# Patient Record
Sex: MALE | Race: WHITE
[De-identification: names, ages, dates, MRNs, and addresses within clinical notes are randomized per-mention and may not be internally consistent; named-entity substitution may affect disease eponyms.]

---

## 2018-04-28 ENCOUNTER — HOSPITAL ENCOUNTER (EMERGENCY)
Dept: HOSPITAL 97 - ER | Age: 73
Discharge: HOME | End: 2018-04-28
Payer: COMMERCIAL

## 2018-04-28 VITALS — TEMPERATURE: 98.5 F

## 2018-04-28 VITALS — OXYGEN SATURATION: 96 % | DIASTOLIC BLOOD PRESSURE: 63 MMHG | SYSTOLIC BLOOD PRESSURE: 133 MMHG
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
## 2018-04-28 DIAGNOSIS — M25.551: Primary | ICD-10-CM
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
## 2018-04-28 PROCEDURE — 72192 CT PELVIS W/O DYE: CPT

## 2018-04-28 PROCEDURE — 99284 EMERGENCY DEPT VISIT MOD MDM: CPT

## 2018-04-28 NOTE — ER
Nurse's Notes                                                                                     

 [facility]                                                                

Name: Jamie Chou                                                                                 

Age: 72 yrs                                                                                       

Sex: Male                                                                                         

: 1945                                                                                   

MRN: [id number]                                                                                   

Arrival Date: 2018                                                                          

Time: 14:57                                                                                       

Account#: [id number]                                                                            

Bed 24                                                                                            

Private MD: None, None                                                                            

Diagnosis: Fall on same level, unspecified;Pain in left hip;Pain in right hip                     

                                                                                                  

Presentation:                                                                                     

                                                                                             

15:08 Presenting complaint: Patient states: I fell on Thursday from standing and have been    la1 

      having pain in greg hips and lower back since the iunjury. Transition of care: patient       

      was not received from another setting of care. Onset of symptoms was 2018.        

      Initial Sepsis Screen: Does the patient meet any 2 criteria? No. Patient's initial          

      sepsis screen is negative. Does the patient have a suspected source of infection? No.       

      Patient's initial sepsis screen is negative. Care prior to arrival: None.                   

15:08 Method Of Arrival: Wheelchair                                                           la1 

15:08 Acuity: TIMOTEO 3                                                                           la1 

                                                                                                  

Historical:                                                                                       

- Allergies:                                                                                      

15:09 No Known Allergies;                                                                     la1 

- PMHx:                                                                                           

15:09 None;                                                                                   la1 

                                                                                                  

- Immunization history:: Adult Immunizations up to date.                                          

- Social history:: Smoking status: Patient/guardian denies using tobacco.                         

                                                                                                  

                                                                                                  

Screening:                                                                                        

15:50 Abuse screen: Denies threats or abuse. Nutritional screening: No deficits noted.        tl3 

      Tuberculosis screening: No symptoms or risk factors identified. Fall Risk None              

      identified.                                                                                 

                                                                                                  

Assessment:                                                                                       

15:50 General: Appears uncomfortable, slender, well groomed, well developed, well nourished,  tl3 

      Behavior is calm, cooperative, appropriate for age. Pain: Complains of pain in left hip     

      and right hip. Neuro: Level of Consciousness is awake, alert, obeys commands, Oriented      

      to person, place, time, situation, Appropriate for age. Cardiovascular: Heart tones S1      

      S2 present Capillary refill < 3 seconds in bilateral fingers toes. Respiratory: Airway      

      is patent Trachea midline Respiratory effort is even, unlabored, Respiratory pattern is     

      regular, symmetrical, Breath sounds are clear bilaterally. GI: No signs and/or symptoms     

      were reported involving the gastrointestinal system. : No signs and/or symptoms were      

      reported regarding the genitourinary system. EENT: No signs and/or symptoms were            

      reported regarding the EENT system. Derm: No signs and/or symptoms reported regarding       

      the dermatologic system. Musculoskeletal: Reports falling on Thursday while pulling on      

      an ice chest backwards through a doorway, did no fall back and hit head pain is greater     

      on the right side than the left.                                                            

17:27 Reassessment: Patient appears in no apparent distress at this time. No changes from     tl3 

      previously documented assessment. Patient and/or family updated on plan of care and         

      expected duration. Pain level reassessed. Patient is alert, oriented x 3, equal             

      unlabored respirations, skin warm/dry/pink.                                                 

                                                                                                  

Vital Signs:                                                                                      

15:09  / 90; Pulse 71; Resp 16; Temp 98.5; Pulse Ox 95% on R/A; Weight 58.97 kg; Height la1 

      5 ft. 4 in. (162.56 cm);                                                                    

16:36  / 63; Pulse 62; Resp 18; Pulse Ox 96% ;                                          tl3 

15:09 Body Mass Index 22.31 (58.97 kg, 162.56 cm)                                             la1 

                                                                                                  

ED Course:                                                                                        

14:57 Patient arrived in ED.                                                                  mr  

14:58 None, None is Private Physician.                                                        mr  

15:09 Triage completed.                                                                       la1 

15:09 Arm band placed on left wrist.                                                          la1 

15:12 [facility]                                                        snw 

15:12 Rob Conroy MD is Attending Physician.                                             snw 

15:48 Maryan Selby, RN is Primary Nurse.                                                     tl3 

15:50 Patient moved to CT.                                                                    tl3 

15:50 Patient has correct armband on for positive identification. Bed in low position. Call   tl3 

      light in reach. Side rails up X 1. Adult w/ patient.                                        

15:50 No provider procedures requiring assistance completed.                                  tl3 

15:54 Pelvis Wo Cont CT Sent.                                                                 tl3 

15:55 CT completed. Patient tolerated procedure well. Patient moved back from CT.             bq  

15:56 Pelvis Wo Cont CT In Process Unspecified.                                               EDMS

17:27 Patient did not have IV access during this emergency room visit.                        tl3 

                                                                                                  

Administered Medications:                                                                         

16:10 Drug: Norco 5 mg-325 mg 1 tabs Route: PO;                                               tl3 

17:28 Follow up: Response: No adverse reaction; Pain is decreased                             tl3 

                                                                                                  

                                                                                                  

Outcome:                                                                                          

16:51 Discharge ordered by MD.                                                                snw 

17:27 Discharged to home ambulatory.                                                          tl3 

17:27 Condition: good                                                                             

17:27 Discharge instructions given to patient, family, Instructed on discharge instructions,      

      follow up and referral plans. medication usage, Demonstrated understanding of               

      instructions, follow-up care, medications, Prescriptions given X 1.                         

17:28 Patient left the ED.                                                                    tl3 

                                                                                                  

Signatures:                                                                                       

Dispatcher [employer]                           EDMS                                                 

Janet Dewitt, AMYP-C                 FNP-Csnw                                                  

Breanna Fraire                                mr BlancRosenda Lee RN                         RN   la1                                                  

Maryan Selby RN                       RN   tl3                                                  

                                                                                                  

**************************************************************************************************

## 2018-04-28 NOTE — RAD REPORT
EXAM DESCRIPTION:  CT - Pelvis Wo Cont - 4/28/2018 3:56 pm

 

CLINICAL HISTORY:  Fall with back pain and bilateral hip pain.

 

COMPARISON:  None.

 

TECHNIQUE:  All CT scans are performed using dose optimization technique as appropriate and may inclu
de automated exposure control or mA/KV adjustment according to patient size.

 

FINDINGS:  Advanced degenerative change at L4-5 is seen with 8 mm of degenerative anterolisthesis see
n. 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

 

No acute fracture, dislocation or aggressive lesion.

 

Atherosclerosis of the aorta is noted with stents in place in both iliac arteries.

 

IMPRESSION:  No acute abnormality is seen.

 

Advanced degenerative change at L4-5 as described.

## 2018-04-28 NOTE — EDPHYS
Physician Documentation                                                                           

 [facility]                                                                

Name: Jamie Chou                                                                                 

Age: 72 yrs                                                                                       

Sex: Male                                                                                         

: 1945                                                                                   

MRN: [id number]                                                                                   

Arrival Date: 2018                                                                          

Time: 14:57                                                                                       

Account#: [id number]                                                                            

Bed 24                                                                                            

Private MD: None, None                                                                            

ED Physician Rob Conroy                                                                      

HPI:                                                                                              

                                                                                             

15:37 This 72 yrs old  Male presents to ER via Wheelchair with complaints of Fall    snw 

      Injury.                                                                                     

15:37 Details of fall: The patient fell from an upright position, while standing. Onset: The  snw 

      symptoms/episode began/occurred suddenly, 3 day(s) ago, and became persistent.              

      Associated injuries: The patient sustained bilateral hips. Severity of symptoms: At         

      their worst the symptoms were moderate. The patient has not experienced similar             

      symptoms in the past. The patient has not recently seen a physician. no LOC, no contact     

      with head .                                                                                 

                                                                                                  

Historical:                                                                                       

- Allergies:                                                                                      

15:09 No Known Allergies;                                                                     la1 

- PMHx:                                                                                           

15:09 None;                                                                                   la1 

                                                                                                  

- Immunization history:: Adult Immunizations up to date.                                          

- Social history:: Smoking status: Patient/guardian denies using tobacco.                         

                                                                                                  

                                                                                                  

ROS:                                                                                              

15:37 Constitutional: Negative for fever, chills, and weight loss, Eyes: Negative for injury, snw 

      pain, redness, and discharge, ENT: Negative for injury, pain, and discharge, Neck:          

      Negative for injury, pain, and swelling, Cardiovascular: Negative for chest pain,           

      palpitations, and edema, Respiratory: Negative for shortness of breath, cough,              

      wheezing, and pleuritic chest pain, Abdomen/GI: Negative for abdominal pain, nausea,        

      vomiting, diarrhea, and constipation, Back: Negative for injury and pain, : Negative      

      for injury, bleeding, discharge, and swelling, Skin: Negative for injury, rash, and         

      discoloration, Neuro: Negative for headache, weakness, numbness, tingling, and seizure.     

15:37 MS/extremity: Positive for injury or acute deformity, contusion, decreased range of         

      motion, pain, of the right greater than left hip pain.                                      

                                                                                                  

Exam:                                                                                             

15:36 Constitutional:  This is a well developed, well nourished patient who is awake, alert,  snw 

      and in no acute distress. Head/Face:  Normocephalic, atraumatic. Eyes:  Pupils equal        

      round and reactive to light, extra-ocular motions intact.  Lids and lashes normal.          

      Conjunctiva and sclera are non-icteric and not injected.  Cornea within normal limits.      

      Periorbital areas with no swelling, redness, or edema. ENT:  Nares patent. No nasal         

      discharge, no septal abnormalities noted.  Tympanic membranes are normal and external       

      auditory canals are clear.  Oropharynx with no redness, swelling, or masses, exudates,      

      or evidence of obstruction, uvula midline.  Mucous membranes moist. Neck:  Trachea          

      midline, no thyromegaly or masses palpated, and no cervical lymphadenopathy.  Supple,       

      full range of motion without nuchal rigidity, or vertebral point tenderness.  No            

      Meningismus. Chest/axilla:  Normal chest wall appearance and motion.  Nontender with no     

      deformity.  No lesions are appreciated. Cardiovascular:  Regular rate and rhythm with a     

      normal S1 and S2.  No gallops, murmurs, or rubs.  Normal PMI, no JVD.  No pulse             

      deficits. Respiratory:  Lungs have equal breath sounds bilaterally, clear to                

      auscultation and percussion.  No rales, rhonchi or wheezes noted.  No increased work of     

      breathing, no retractions or nasal flaring. Abdomen/GI:  Soft, non-tender, with normal      

      bowel sounds.  No distension or tympany.  No guarding or rebound.  No evidence of           

      tenderness throughout. Back:  No spinal tenderness.  No costovertebral tenderness.          

      Full range of motion. Skin:  Warm, dry with normal turgor.  Normal color with no            

      rashes, no lesions, and no evidence of cellulitis. Neuro:  Awake and alert, GCS 15,         

      oriented to person, place, time, and situation.  Cranial nerves II-XII grossly intact.      

      Motor strength 5/5 in all extremities.  Sensory grossly intact.  Cerebellar exam            

      normal.  Normal gait.                                                                       

15:36 Musculoskeletal/extremity: Extremities: grossly normal except: noted in the bilateral       

      hips: contusion, decreased ROM, Circulation is intact in all extremities. Sensation         

      intact.                                                                                     

                                                                                                  

Vital Signs:                                                                                      

15:09  / 90; Pulse 71; Resp 16; Temp 98.5; Pulse Ox 95% on R/A; Weight 58.97 kg; Height la1 

      5 ft. 4 in. (162.56 cm);                                                                    

16:36  / 63; Pulse 62; Resp 18; Pulse Ox 96% ;                                          tl3 

15:09 Body Mass Index 22.31 (58.97 kg, 162.56 cm)                                             la1 

                                                                                                  

MDM:                                                                                              

15:14 Patient medically screened.                                                             snw 

16:52 Data reviewed: vital signs, nurses notes. Data interpreted: Pulse oximetry: on room air snw 

      is 96 %. Interpretation: normal. Counseling: I had a detailed discussion with the           

      patient and/or guardian regarding: the historical points, exam findings, and any            

      diagnostic results supporting the discharge/admit diagnosis, radiology results, the         

      need for outpatient follow up, to return to the emergency department if symptoms worsen     

      or persist or if there are any questions or concerns that arise at home. Special            

      discussion: Based on the history and exam findings, there is no indication for further      

      emergent testing or inpatient evaluation. I discussed with the patient/guardian the         

      need to see the primary care provider for further evaluation of the symptoms.               

                                                                                                  

                                                                                             

15:14 Order name: Pelvis Wo Cont CT; Complete Time: 16:29                                     snw 

                                                                                                  

Administered Medications:                                                                         

16:10 Drug: Norco 5 mg-325 mg 1 tabs Route: PO;                                               tl3 

17:28 Follow up: Response: No adverse reaction; Pain is decreased                             tl3 

                                                                                                  

                                                                                                  

Disposition:                                                                                      

18 16:51 Discharged to Home. Impression: Fall on same level, unspecified, Pain in left      

  hip, Pain in right hip.                                                                         

- Condition is Stable.                                                                            

- Discharge Instructions: Arthralgia, Arthritis, Nonspecific, Musculoskeletal Pain,               

  Cryotherapy, Easy-to-Read, Hip Pain, Cryotherapy, Heat Therapy, Heat Therapy,                   

  Easy-to-Read.                                                                                   

- Prescriptions for Tylenol- Codeine #3 300-30 mg Oral Tablet - take 2 tablets by ORAL            

  route every 6 hours As needed; 20 tablet.                                                       

- Medication Reconciliation Form, Thank You Letter, Antibiotic Education, Prescription            

  Opioid Use form.                                                                                

- Follow up: Emergency Department; When: As needed; Reason: Worsening of condition.               

  Follow up: Private Physician; When: 2 - 3 days; Reason: Recheck today's complaints,             

  Continuance of care, Re-evaluation by your physician.                                           

                                                                                                  

                                                                                                  

                                                                                                  

Addendum:                                                                                         

2018                                                                                        

     09:03 Co-signature as Attending Physician, Rob Conroy MD I agree with the assessment and  c
ha

           plan of care.                                                                          

                                                                                                  

Signatures:                                                                                       

Dispatcher [employer]                           Rob Buckley MD MD cha Therrien, Shelly, FNP-C                 FNP-Csnw                                                  

Wayne Mehta RN                         RN   la1                                                  

Maryan Selby RN                       RN   tl3                                                  

                                                                                                  

**************************************************************************************************